# Patient Record
Sex: FEMALE
[De-identification: names, ages, dates, MRNs, and addresses within clinical notes are randomized per-mention and may not be internally consistent; named-entity substitution may affect disease eponyms.]

---

## 2023-04-13 ENCOUNTER — APPOINTMENT (OUTPATIENT)
Dept: OTOLARYNGOLOGY | Facility: CLINIC | Age: 53
End: 2023-04-13
Payer: COMMERCIAL

## 2023-04-13 ENCOUNTER — NON-APPOINTMENT (OUTPATIENT)
Age: 53
End: 2023-04-13

## 2023-04-13 VITALS
DIASTOLIC BLOOD PRESSURE: 74 MMHG | HEIGHT: 62 IN | BODY MASS INDEX: 24.66 KG/M2 | TEMPERATURE: 98 F | SYSTOLIC BLOOD PRESSURE: 108 MMHG | WEIGHT: 134 LBS | HEART RATE: 76 BPM

## 2023-04-13 DIAGNOSIS — J34.89 OTHER SPECIFIED DISORDERS OF NOSE AND NASAL SINUSES: ICD-10-CM

## 2023-04-13 DIAGNOSIS — J01.01 ACUTE RECURRENT MAXILLARY SINUSITIS: ICD-10-CM

## 2023-04-13 PROCEDURE — 99204 OFFICE O/P NEW MOD 45 MIN: CPT | Mod: 25

## 2023-04-13 PROCEDURE — 92567 TYMPANOMETRY: CPT

## 2023-04-13 PROCEDURE — 92557 COMPREHENSIVE HEARING TEST: CPT

## 2023-04-13 PROCEDURE — 31231 NASAL ENDOSCOPY DX: CPT

## 2023-04-13 NOTE — END OF VISIT
[FreeTextEntry3] : I, Dr. Montemayor personally performed the evaluation and management (E/M) services including all necessary procedures, for this new patient. That E/M includes conducting the clinically appropriate initial history &/or exam, assessing all conditions, and establishing the plan of care. Today, my MANDO, Mouna Wasserman, was here to observe &/or participate in the visit & follow plan of care established by me.\par \par \par

## 2023-04-13 NOTE — REASON FOR VISIT
[Initial Evaluation] : an initial evaluation for [FreeTextEntry2] : sinus issues and ringingin the ears

## 2023-04-13 NOTE — HISTORY OF PRESENT ILLNESS
[de-identified] : Patient has been having about a month of ringing in both ears waking her up at night. SHe admits that the noises are constant and bilateral\par She has also had severe sinus pressure at least several times a month for the last 6 months. She has tried advil cold and sinus along with sudafed that provides minimal temporary relief. She admits that the pain is so severe on occasion that it causes vomiting and leads to migraine like pain. SHe pain starts between the eyes and can radiate to the cheeks bilaterally. She just finished amoxicillin last week for the acute sinusitis

## 2023-04-13 NOTE — ASSESSMENT
[FreeTextEntry1] : With a history of migraines as a child recently has been having new onset tinnitus also some struggling over the last 6 months recurrent sinus pressure was recently treated with him antibiotics for severe acute sinus infection endoscopically she has a deviated septum to the left but no evidence of any pus or purulence ear examination is normal audiogram showed bilateral high-frequency symmetrical hearing loss we will send her for a CAT scan of her paranasal sinuses she will do some masking to help with her tinnitus and she will follow-up with us after the CAT scan.

## 2023-04-13 NOTE — REVIEW OF SYSTEMS
[Ear Noises] : ear noises [As Noted in HPI] : as noted in HPI [Recurrent Sinus Infections] : recurrent sinus infections [Sinus Pain] : sinus pain [Sinus Pressure] : sinus pressure [Negative] : Heme/Lymph

## 2023-04-26 ENCOUNTER — APPOINTMENT (OUTPATIENT)
Dept: CT IMAGING | Facility: IMAGING CENTER | Age: 53
End: 2023-04-26
Payer: COMMERCIAL

## 2023-04-26 ENCOUNTER — OUTPATIENT (OUTPATIENT)
Dept: OUTPATIENT SERVICES | Facility: HOSPITAL | Age: 53
LOS: 1 days | End: 2023-04-26
Payer: COMMERCIAL

## 2023-04-26 DIAGNOSIS — J01.01 ACUTE RECURRENT MAXILLARY SINUSITIS: ICD-10-CM

## 2023-04-26 PROCEDURE — 70486 CT MAXILLOFACIAL W/O DYE: CPT | Mod: 26

## 2023-04-26 PROCEDURE — 70486 CT MAXILLOFACIAL W/O DYE: CPT

## 2023-04-27 ENCOUNTER — NON-APPOINTMENT (OUTPATIENT)
Age: 53
End: 2023-04-27

## 2023-05-05 ENCOUNTER — APPOINTMENT (OUTPATIENT)
Dept: OTOLARYNGOLOGY | Facility: CLINIC | Age: 53
End: 2023-05-05
Payer: COMMERCIAL

## 2023-05-05 VITALS — BODY MASS INDEX: 24.66 KG/M2 | WEIGHT: 134 LBS | HEIGHT: 62 IN

## 2023-05-05 DIAGNOSIS — J34.2 DEVIATED NASAL SEPTUM: ICD-10-CM

## 2023-05-05 DIAGNOSIS — R51.9 HEADACHE, UNSPECIFIED: ICD-10-CM

## 2023-05-05 DIAGNOSIS — H93.13 TINNITUS, BILATERAL: ICD-10-CM

## 2023-05-05 DIAGNOSIS — J30.2 OTHER SEASONAL ALLERGIC RHINITIS: ICD-10-CM

## 2023-05-05 PROCEDURE — 95004 PERQ TESTS W/ALRGNC XTRCS: CPT

## 2023-05-05 PROCEDURE — 31231 NASAL ENDOSCOPY DX: CPT

## 2023-05-05 PROCEDURE — 99214 OFFICE O/P EST MOD 30 MIN: CPT | Mod: 25

## 2023-05-05 NOTE — ASSESSMENT
[FreeTextEntry1] : Patient follows up reviewed her CAT scan shows her sinuses are clear documented her deviated septum with a potential contact pressure-point on the left side continues to get migraine headaches which are new for her we did allergy testing on her today strongly recommend that she see an neurologist to help manage her migraines.  Consider a septoplasty only as a last ditch effort if nothing else works to eliminate a potential contact pressure-point.

## 2023-05-05 NOTE — HISTORY OF PRESENT ILLNESS
[de-identified] : Patient comes in with continued headaches on the let side of the head and face, along with feeling of pressure. She uses Flonase daily and she continues to feel itching in the nasal  cavity as well. She does not believe that she has been tested for allergies. \par She has not taken any allergy pills in the last week. \par She also has some echoing in the head as well that bothers her.

## 2023-05-05 NOTE — END OF VISIT
[FreeTextEntry3] : I, Dr. Montemayor personally performed the evaluation and management (E/M) services , including all procedures, for this established patient who presents today with (a) new problem(s)/exacerbation of (an) existing condition(s). That E/M includes conducting the clinically appropriate interval history &/or exam, assessing all new/exacerbated conditions, and establishing a new plan of care. Today, my MANDO, Mouna Wasserman, was here to observe &/or participate in the visit & follow plan of care established by me.\par \par \par